# Patient Record
Sex: FEMALE | Race: BLACK OR AFRICAN AMERICAN
[De-identification: names, ages, dates, MRNs, and addresses within clinical notes are randomized per-mention and may not be internally consistent; named-entity substitution may affect disease eponyms.]

---

## 2017-09-03 ENCOUNTER — HEALTH MAINTENANCE LETTER (OUTPATIENT)
Age: 22
End: 2017-09-03

## 2018-01-07 ENCOUNTER — HEALTH MAINTENANCE LETTER (OUTPATIENT)
Age: 23
End: 2018-01-07

## 2019-08-23 ENCOUNTER — HOSPITAL ENCOUNTER (EMERGENCY)
Facility: CLINIC | Age: 24
Discharge: HOME | End: 2019-08-23
Attending: EMERGENCY MEDICINE | Admitting: EMERGENCY MEDICINE

## 2019-08-23 VITALS
SYSTOLIC BLOOD PRESSURE: 108 MMHG | WEIGHT: 161 LBS | OXYGEN SATURATION: 96 % | DIASTOLIC BLOOD PRESSURE: 70 MMHG | RESPIRATION RATE: 16 BRPM | TEMPERATURE: 98.1 F

## 2019-08-23 DIAGNOSIS — R10.84 ABDOMINAL PAIN, GENERALIZED: ICD-10-CM

## 2019-08-23 LAB
ALBUMIN SERPL-MCNC: 3.5 G/DL (ref 3.4–5)
ALBUMIN UR-MCNC: NEGATIVE MG/DL
ALP SERPL-CCNC: 115 U/L (ref 40–150)
ALT SERPL W P-5'-P-CCNC: 39 U/L (ref 0–50)
AMORPH CRY #/AREA URNS HPF: ABNORMAL /HPF
ANION GAP SERPL CALCULATED.3IONS-SCNC: 7 MMOL/L (ref 3–14)
APPEARANCE UR: CLEAR
AST SERPL W P-5'-P-CCNC: 26 U/L (ref 0–45)
BACTERIA #/AREA URNS HPF: ABNORMAL /HPF
BASOPHILS # BLD AUTO: 0 10E9/L (ref 0–0.2)
BASOPHILS NFR BLD AUTO: 0.2 %
BILIRUB SERPL-MCNC: 0.2 MG/DL (ref 0.2–1.3)
BILIRUB UR QL STRIP: NEGATIVE
BUN SERPL-MCNC: 18 MG/DL (ref 7–30)
CALCIUM SERPL-MCNC: 8.6 MG/DL (ref 8.5–10.1)
CHLORIDE SERPL-SCNC: 105 MMOL/L (ref 94–109)
CO2 SERPL-SCNC: 27 MMOL/L (ref 20–32)
COLOR UR AUTO: YELLOW
CREAT SERPL-MCNC: 0.76 MG/DL (ref 0.52–1.04)
DIFFERENTIAL METHOD BLD: NORMAL
EOSINOPHIL # BLD AUTO: 0.1 10E9/L (ref 0–0.7)
EOSINOPHIL NFR BLD AUTO: 1.5 %
ERYTHROCYTE [DISTWIDTH] IN BLOOD BY AUTOMATED COUNT: 12.3 % (ref 10–15)
GFR SERPL CREATININE-BSD FRML MDRD: >90 ML/MIN/{1.73_M2}
GLUCOSE SERPL-MCNC: 126 MG/DL (ref 70–99)
GLUCOSE UR STRIP-MCNC: NEGATIVE MG/DL
HCG UR QL: NEGATIVE
HCT VFR BLD AUTO: 40.3 % (ref 35–47)
HGB BLD-MCNC: 13.5 G/DL (ref 11.7–15.7)
HGB UR QL STRIP: NEGATIVE
IMM GRANULOCYTES # BLD: 0 10E9/L (ref 0–0.4)
IMM GRANULOCYTES NFR BLD: 0.4 %
KETONES UR STRIP-MCNC: NEGATIVE MG/DL
LEUKOCYTE ESTERASE UR QL STRIP: NEGATIVE
LIPASE SERPL-CCNC: 152 U/L (ref 73–393)
LYMPHOCYTES # BLD AUTO: 3.3 10E9/L (ref 0.8–5.3)
LYMPHOCYTES NFR BLD AUTO: 40.4 %
MCH RBC QN AUTO: 29.3 PG (ref 26.5–33)
MCHC RBC AUTO-ENTMCNC: 33.5 G/DL (ref 31.5–36.5)
MCV RBC AUTO: 88 FL (ref 78–100)
MONOCYTES # BLD AUTO: 0.7 10E9/L (ref 0–1.3)
MONOCYTES NFR BLD AUTO: 8 %
NEUTROPHILS # BLD AUTO: 4.1 10E9/L (ref 1.6–8.3)
NEUTROPHILS NFR BLD AUTO: 49.5 %
NITRATE UR QL: NEGATIVE
NRBC # BLD AUTO: 0 10*3/UL
NRBC BLD AUTO-RTO: 0 /100
PH UR STRIP: 6.5 PH (ref 5–7)
PLATELET # BLD AUTO: 290 10E9/L (ref 150–450)
POTASSIUM SERPL-SCNC: 3.6 MMOL/L (ref 3.4–5.3)
PROT SERPL-MCNC: 8.2 G/DL (ref 6.8–8.8)
RBC # BLD AUTO: 4.6 10E12/L (ref 3.8–5.2)
RBC #/AREA URNS AUTO: 1 /HPF (ref 0–2)
SODIUM SERPL-SCNC: 139 MMOL/L (ref 133–144)
SOURCE: ABNORMAL
SP GR UR STRIP: 1.02 (ref 1–1.03)
SQUAMOUS #/AREA URNS AUTO: 2 /HPF (ref 0–1)
UROBILINOGEN UR STRIP-MCNC: 2 MG/DL (ref 0–2)
WBC # BLD AUTO: 8.3 10E9/L (ref 4–11)
WBC #/AREA URNS AUTO: 1 /HPF (ref 0–5)

## 2019-08-23 PROCEDURE — 81025 URINE PREGNANCY TEST: CPT | Performed by: FAMILY MEDICINE

## 2019-08-23 PROCEDURE — 83690 ASSAY OF LIPASE: CPT | Performed by: EMERGENCY MEDICINE

## 2019-08-23 PROCEDURE — 99283 EMERGENCY DEPT VISIT LOW MDM: CPT | Performed by: EMERGENCY MEDICINE

## 2019-08-23 PROCEDURE — 80053 COMPREHEN METABOLIC PANEL: CPT | Performed by: EMERGENCY MEDICINE

## 2019-08-23 PROCEDURE — 81001 URINALYSIS AUTO W/SCOPE: CPT | Performed by: FAMILY MEDICINE

## 2019-08-23 PROCEDURE — 99283 EMERGENCY DEPT VISIT LOW MDM: CPT | Mod: Z6 | Performed by: EMERGENCY MEDICINE

## 2019-08-23 PROCEDURE — 85025 COMPLETE CBC W/AUTO DIFF WBC: CPT | Performed by: EMERGENCY MEDICINE

## 2019-08-23 ASSESSMENT — ENCOUNTER SYMPTOMS
FREQUENCY: 1
ARTHRALGIAS: 0
SHORTNESS OF BREATH: 0
DYSURIA: 0
COLOR CHANGE: 0
BLOOD IN STOOL: 0
HEADACHES: 1
NAUSEA: 1
CONFUSION: 0
DIARRHEA: 1
FEVER: 0
NECK STIFFNESS: 0
ABDOMINAL PAIN: 1
VOMITING: 0
DIFFICULTY URINATING: 0

## 2019-08-23 NOTE — ED AVS SNAPSHOT
[facility], [place], Emergency Department  [address]  [facility] [place] [address]  Phone:  [phone]  Fax:  [phone]                                    Dara Echeverria   MRN: [id number]    Department:  [facility], Emergency Department   Date of Visit:  8/23/2019           After Visit Summary Signature Page    I have received my discharge instructions, and my questions have been answered. I have discussed any challenges I see with this plan with the nurse or doctor.    ..........................................................................................................................................  Patient/Patient Representative Signature      ..........................................................................................................................................  Patient Representative Print Name and Relationship to Patient    ..................................................               ................................................  Date                                   Time    ..........................................................................................................................................  Reviewed by Signature/Title    ...................................................              ..............................................  Date                                               Time          22EPIC Rev 08/18

## 2019-08-24 NOTE — DISCHARGE INSTRUCTIONS
Please make an appointment to follow [facility] with [facility] (phone: [phone] as soon as possible if you have any concerns.

## 2020-08-17 NOTE — ED PROVIDER NOTES
[facility] EM[facility]Y DE[facility] ([facility])     August 23, 2019    History     Chief Complaint   Patient presents with     Abdominal Pain     LMP 2 months ago, thinks she might be pregnant. having left sided abd pain and headache.     АЛЕКСАНДР Echeverria is an otherwise healthy 24 year old female who presents to the ED for evaluation of a mild headache and left-sided abdominal pain that started yesterday. Patient reports her LMP was 2 months ago, and she thinks she might be pregnant. She states she is sexually active with her , and she is not on any form of birth control. Patient has not taken a pregnancy test at home. She complains of nausea and urinary frequency. She states she has had a little diarrhea as well. Patient denies vaginal spotting or discharge, dysuria, vomiting, or blood in her stool. She also denies recent travel. Of note, patient states she had similar symptoms in the past and found out she was pregnant. She reports she has been pregnant once, and had a vaginal delivery without complications in June 2018. Patient denies previous abdominal surgeries.      PAST MEDICAL HISTORY  History reviewed. No pertinent past medical history.  PAST SURGICAL HISTORY  History reviewed. No pertinent surgical history.  FAMILY HISTORY  History reviewed. No pertinent family history.  SOCIAL HISTORY  Social History     Tobacco Use     Smoking status: Never Smoker     Smokeless tobacco: Never Used   Substance Use Topics     Alcohol use: No     MEDICATIONS  No current facility-administered medications for this encounter.      No current outpatient medications on file.     ALLERGIES  No Known Allergies    I have reviewed the Medications, Allergies, Past Medical and Surgical History, and Social History in the Epic system.    Review of Systems   Constitutional: Negative for fever.   HENT: Negative for congestion.    Eyes: Negative for visual disturbance.   Respiratory: Negative for shortness of breath.     Cardiovascular: Negative for chest pain.   Gastrointestinal: Positive for abdominal pain (left-sided), diarrhea and nausea. Negative for blood in stool and vomiting.   Genitourinary: Positive for frequency. Negative for difficulty urinating, dysuria, vaginal bleeding and vaginal discharge.   Musculoskeletal: Negative for arthralgias and neck stiffness.   Skin: Negative for color change.   Neurological: Positive for headaches (mild).   Psychiatric/Behavioral: Negative for confusion.       Physical Exam   BP: 129/81  Heart Rate: 94  Temp: 97.9  F (36.6  C)  Resp: 16  Weight: 73 kg (161 lb)  SpO2: 100 %      Physical Exam   Constitutional: No distress.   HENT:   Head: Atraumatic.   Mouth/Throat: Oropharynx is clear and moist. No oropharyngeal exudate.   Eyes: Pupils are equal, round, and reactive to light. No scleral icterus.   Cardiovascular: Normal heart sounds and intact distal pulses.   Pulmonary/Chest: Breath sounds normal. No respiratory distress.   Abdominal: Soft. Bowel sounds are normal. There is no rigidity, no rebound, no guarding, no CVA tenderness and no tenderness at McBurney's point.   Mild tenderness palpation in left lower quadrant without guarding rebound or other peritoneal signs.  Abdomen is otherwise soft.  Normal bowel sounds x4.  No overlying skin changes.  CVA tenderness negative.   Musculoskeletal: She exhibits no edema or tenderness.   Skin: Skin is warm. No rash noted. She is not diaphoretic.       ED Course        Procedures       7:34 PM  The patient was seen and examined by Dr. Berger in Room 05.          Labs Ordered and Resulted from Time of ED Arrival Up to the Time of Departure from the ED   ROUTINE UA WITH MICROSCOPIC - Abnormal; Notable for the following components:       Result Value    Bacteria Urine Few (*)     Squamous Epithelial /HPF Urine 2 (*)     Amorphous Crystals Few (*)     All other components within normal limits   COMPREHENSIVE METABOLIC PANEL - Abnormal; Notable for  the following components:    Glucose 126 (*)     All other components within normal limits   HCG QUALITATIVE URINE   CBC WITH PLATELETS DIFFERENTIAL   LIPASE            Assessments & Plan (with Medical Decision Making)   Patient presents with left lower quadrant discomfort.  On exam, she has normal vital signs.  She has some very minimal tenderness in this area but really has a nonspecific physical exam.  I did order some baseline labs in the analysis and urine pregnancy test.  This work-up is negative.  On reassessment, patient states that her pain is completely resolved.  She admits that she really just wanted a pregnancy test to confirm that she is not pregnant.  She is not having any abdominal pain at this time.  I told her that urine pregnancy test can be falsely negative early in pregnancy and if she continues to have metrorhaggia she should follow-up with her doctor and/or OB/GYN for repeat evaluation.  Patient understands and is comfortable with this plan.  She will return to the emergency department with any new or worsening symptoms.      I have reviewed the nursing notes.    I have reviewed the findings, diagnosis, plan and need for follow up with the patient.    New Prescriptions    No medications on file       Final diagnoses:   Abdominal pain, generalized     ILeticia, am serving as a trained medical scribe to document services personally performed by Taco Berger DO, based on the provider's statements to me.      ITaco DO, was physically present and have reviewed and verified the accuracy of this note documented by Leticia Hassan.     8/23/2019   [facility], [place], EMERGENCY DEPARTMENT     Taco Berger DO  08/23/19 2059       Taco Berger DO  08/23/19 8011     DISPLAY PLAN FREE TEXT DISPLAY PLAN FREE TEXT DISPLAY PLAN FREE TEXT

## 2021-04-08 ENCOUNTER — PRE VISIT (OUTPATIENT)
Dept: NEUROLOGY | Facility: CLINIC | Age: 26
End: 2021-04-08

## 2021-04-08 NOTE — TELEPHONE ENCOUNTER
FUTURE VISIT INFORMATION      FUTURE VISIT INFORMATION:    Date: 4/12/2021    Time: 330pm    Location: [facility]  REFERRAL INFORMATION:    Referring provider:  Self     Referring providers clinic:      Reason for visit/diagnosis  Headaches     RECORDS REQUESTED FROM:       Clinic name Comments Records Status Imaging Status   [facility] ED Visit-2/19/2021    CT Head-2/19/2021 Care Everywhere Requested to PACS                                   4/8/2021-Request for Images faxed to Greg-MR @ 6am    4/12/2021-[facility] Images now in PACS-MR @ 607am

## 2021-04-12 ENCOUNTER — VIRTUAL VISIT (OUTPATIENT)
Dept: NEUROLOGY | Facility: CLINIC | Age: 26
End: 2021-04-12
Payer: COMMERCIAL

## 2021-04-12 DIAGNOSIS — G43.719 INTRACTABLE CHRONIC MIGRAINE WITHOUT AURA AND WITHOUT STATUS MIGRAINOSUS: Primary | ICD-10-CM

## 2021-04-12 PROCEDURE — 99202 OFFICE O/P NEW SF 15 MIN: CPT | Mod: 95 | Performed by: PSYCHIATRY & NEUROLOGY

## 2021-04-12 RX ORDER — METHOCARBAMOL 750 MG/1
750 TABLET, FILM COATED ORAL 3 TIMES DAILY
COMMUNITY
Start: 2021-02-19 | End: 2021-04-12

## 2021-04-12 RX ORDER — NAPROXEN 500 MG/1
500 TABLET ORAL 2 TIMES DAILY
COMMUNITY
Start: 2021-02-19 | End: 2021-04-12

## 2021-04-12 RX ORDER — DOCUSATE SODIUM 100 MG/1
100 CAPSULE, LIQUID FILLED ORAL 2 TIMES DAILY
COMMUNITY
End: 2021-12-20

## 2021-04-12 RX ORDER — SUMATRIPTAN 50 MG/1
50 TABLET, FILM COATED ORAL
Qty: 9 TABLET | Refills: 11 | Status: SHIPPED | OUTPATIENT
Start: 2021-04-12 | End: 2021-12-20

## 2021-04-12 RX ORDER — NORETHINDRONE ACETATE AND ETHINYL ESTRADIOL 1MG-20(21)
1 KIT ORAL DAILY
COMMUNITY
Start: 2020-01-31 | End: 2021-04-12

## 2021-04-12 RX ORDER — VITAMIN A ACETATE, BETA CAROTENE, ASCORBIC ACID, CHOLECALCIFEROL, .ALPHA.-TOCOPHEROL ACETATE, DL-, THIAMINE MONONITRATE, RIBOFLAVIN, NIACINAMIDE, PYRIDOXINE HYDROCHLORIDE, FOLIC ACID, CYANOCOBALAMIN, CALCIUM CARBONATE, FERROUS FUMARATE, ZINC OXIDE, CUPRIC OXIDE 3080; 12; 120; 400; 1; 1.84; 3; 20; 22; 920; 25; 200; 27; 10; 2 [IU]/1; UG/1; MG/1; [IU]/1; MG/1; MG/1; MG/1; MG/1; MG/1; [IU]/1; MG/1; MG/1; MG/1; MG/1; MG/1
1 TABLET, FILM COATED ORAL DAILY
COMMUNITY
End: 2021-12-20

## 2021-04-12 RX ORDER — FERROUS SULFATE 325(65) MG
325 TABLET ORAL 2 TIMES DAILY
COMMUNITY
End: 2021-12-20

## 2021-04-12 RX ORDER — PROPRANOLOL HCL 60 MG
60 CAPSULE, EXTENDED RELEASE 24HR ORAL DAILY
Qty: 30 CAPSULE | Refills: 11 | Status: SHIPPED | OUTPATIENT
Start: 2021-04-12 | End: 2021-12-20

## 2021-04-12 NOTE — LETTER
4/12/2021       RE: Suzy Arias  [address]  [facility] [address]     Dear Colleague,    Thank you for referring your patient, Suzy Arias, to the [facility] [place] [facility]. Please see a copy of my visit note below.    Suzy is a 26 year old who is being evaluated via a billable telephone visit.      What phone number would you like to be contacted at? [phone]  How would you like to obtain your AVS? MyChart  Phone call duration: 24 minutes          [facility]    Suzy Arias MRN# [id number]   Age: 26 year old YOB: 1995     Requesting physician: Referred Self  Marquita Wright            Assessment and Plan:   (G43.719) Intractable chronic migraine without aura and without status migrainosus  (primary encounter diagnosis)  Comment: The patient reports more days with a headache than without for over one year. When severe she has migraine features. She needs a preventive and I explained ot her about medication overuse risk.  Plan:   1. Preventive: propranolol ER (INDERAL LA) 60 MG 24 hr capsule picked due to limited risk of sedation or cognitive slowing. She denies history of low blood pressure or bradycardia  2. Acute treatment: SUMAtriptan (IMITREX) 50 MG tablet prn or ibuprofen or a combination. I explained medicaiton overuse risk    Follow up in person or on video in 3 months, not telephone.    Time caring for the patient today including phone time, record review and documentation 45 minutes.    Cinthya Castro mD           History of Present Illness:     chief complaint: No chief complaint on file.       Suzy Arias is a 26 year old patient presenting for assessment of headache.s She reports she has had headaches for years but getting worse. She recently was in the [facility] ER For a two week unrelenting headache accompanied by blurry vision. The  vision is better now and has not recurred.       Current headache symptoms:  Frequency: daily, but not constant  Quality: throbbing  Location: all over head, no one area, sometimes it feels like the ear is the problem  Duration: hours  Associated symptoms: nausea, photophobia, phonophobia, one episode of blurred vision  Awakens from sleep due to sx's:  No  Precipitating Injury:  No    Triggers: lack of sleep and stress    Previous Treatment Trials:  Acute therapies:  Ibuprofen  Aleve   Methocarbamol-makes her too sleepy    Chronic therapies:  None             Physical Exam:     Unable to do over phone             Pertinent history/data   [facility] Head CT performed and reportedly normal    No family history of migraines    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 Sleeping enough but very busy.     No plans for pregnancy right now, she is not sexually active and not using birth control.     Again, thank you for allowing me to participate in the care of your patient.      Sincerely,    Cinthya Castro MD

## 2021-04-12 NOTE — PROGRESS NOTES
Suzy is a 26 year old who is being evaluated via a billable telephone visit.      What phone number would you like to be contacted at? [phone]  How would you like to obtain your AVS? Tan  Phone call duration: 24 minutes          [facility]    Suzy Arias MRN# [id number]   Age: 26 year old YOB: 1995     Requesting physician: Referred Self  Marquita Wright            Assessment and Plan:   (G43.719) Intractable chronic migraine without aura and without status migrainosus  (primary encounter diagnosis)  Comment: The patient reports more days with a headache than without for over one year. When severe she has migraine features. She needs a preventive and I explained ot her about medication overuse risk.  Plan:   1. Preventive: propranolol ER (INDERAL LA) 60 MG 24 hr capsule picked due to limited risk of sedation or cognitive slowing. She denies history of low blood pressure or bradycardia  2. Acute treatment: SUMAtriptan (IMITREX) 50 MG tablet prn or ibuprofen or a combination. I explained medicaiton overuse risk    Follow up in person or on video in 3 months, not telephone.    Time caring for the patient today including phone time, record review and documentation 45 minutes.    Cinthya Castro mD           History of Present Illness:     chief complaint: No chief complaint on file.       Suzy Arias is a 26 year old patient presenting for assessment of headache.s She reports she has had headaches for years but getting worse. She recently was in the [facility] ER For a two week unrelenting headache accompanied by blurry vision. The vision is better now and has not recurred.       Current headache symptoms:  Frequency: daily, but not constant  Quality: throbbing  Location: all over head, no one area, sometimes it feels like the ear is the problem  Duration: hours  Associated symptoms: nausea, photophobia, phonophobia, one episode of blurred vision  Awakens from sleep due  to sx's:  No  Precipitating Injury:  No    Triggers: lack of sleep and stress    Previous Treatment Trials:  Acute therapies:  Ibuprofen  Aleve   Methocarbamol-makes her too sleepy    Chronic therapies:  None             Physical Exam:     Unable to do over phone             Pertinent history/data   [facility] Head CT performed and reportedly normal    No family history of migraines    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 Sleeping enough but very busy.     No plans for pregnancy right now, she is not sexually active and not using birth control.

## 2021-06-02 ENCOUNTER — ANCILLARY PROCEDURE (OUTPATIENT)
Dept: GENERAL RADIOLOGY | Facility: CLINIC | Age: 26
End: 2021-06-02
Attending: PHYSICIAN ASSISTANT
Payer: COMMERCIAL

## 2021-06-02 ENCOUNTER — OFFICE VISIT (OUTPATIENT)
Dept: FAMILY MEDICINE | Facility: CLINIC | Age: 26
End: 2021-06-02
Payer: COMMERCIAL

## 2021-06-02 VITALS
BODY MASS INDEX: 21.23 KG/M2 | WEIGHT: 127.4 LBS | SYSTOLIC BLOOD PRESSURE: 103 MMHG | HEIGHT: 65 IN | HEART RATE: 77 BPM | TEMPERATURE: 97.3 F | OXYGEN SATURATION: 100 % | DIASTOLIC BLOOD PRESSURE: 68 MMHG

## 2021-06-02 DIAGNOSIS — S59.912A INJURY OF LEFT FOREARM, INITIAL ENCOUNTER: Primary | ICD-10-CM

## 2021-06-02 DIAGNOSIS — T30.0 BURN: ICD-10-CM

## 2021-06-02 DIAGNOSIS — S59.912A INJURY OF LEFT FOREARM, INITIAL ENCOUNTER: ICD-10-CM

## 2021-06-02 PROCEDURE — 99203 OFFICE O/P NEW LOW 30 MIN: CPT | Performed by: PHYSICIAN ASSISTANT

## 2021-06-02 PROCEDURE — 73090 X-RAY EXAM OF FOREARM: CPT | Mod: LT | Performed by: RADIOLOGY

## 2021-06-02 RX ORDER — BACITRACIN ZINC 500 [USP'U]/G
OINTMENT TOPICAL 2 TIMES DAILY
Qty: 30 G | Refills: 1 | Status: SHIPPED | OUTPATIENT
Start: 2021-06-02

## 2021-06-02 SDOH — HEALTH STABILITY: MENTAL HEALTH: HOW MANY STANDARD DRINKS CONTAINING ALCOHOL DO YOU HAVE ON A TYPICAL DAY?: NOT ASKED

## 2021-06-02 SDOH — HEALTH STABILITY: MENTAL HEALTH: HOW OFTEN DO YOU HAVE 6 OR MORE DRINKS ON ONE OCCASION?: NOT ASKED

## 2021-06-02 SDOH — HEALTH STABILITY: MENTAL HEALTH: HOW OFTEN DO YOU HAVE A DRINK CONTAINING ALCOHOL?: NOT ASKED

## 2021-06-02 ASSESSMENT — PAIN SCALES - GENERAL: PAINLEVEL: NO PAIN (0)

## 2021-06-02 ASSESSMENT — MIFFLIN-ST. JEOR: SCORE: 1324.37
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
## 2021-12-20 ENCOUNTER — VIRTUAL VISIT (OUTPATIENT)
Dept: NEUROLOGY | Facility: CLINIC | Age: 26
End: 2021-12-20
Payer: COMMERCIAL

## 2021-12-20 DIAGNOSIS — G43.709 CHRONIC MIGRAINE WITHOUT AURA WITHOUT STATUS MIGRAINOSUS, NOT INTRACTABLE: ICD-10-CM

## 2021-12-20 DIAGNOSIS — R25.3 EYE MUSCLE TWITCHES: Primary | ICD-10-CM

## 2021-12-20 PROCEDURE — 99214 OFFICE O/P EST MOD 30 MIN: CPT | Mod: 95 | Performed by: PSYCHIATRY & NEUROLOGY

## 2021-12-20 RX ORDER — SUMATRIPTAN 50 MG/1
50 TABLET, FILM COATED ORAL
Qty: 9 TABLET | Refills: 11 | Status: SHIPPED | OUTPATIENT
Start: 2021-12-20

## 2021-12-20 RX ORDER — PROPRANOLOL HCL 60 MG
60 CAPSULE, EXTENDED RELEASE 24HR ORAL DAILY
Qty: 30 CAPSULE | Refills: 11 | Status: SHIPPED | OUTPATIENT
Start: 2021-12-20 | End: 2022-05-06

## 2021-12-20 ASSESSMENT — HEADACHE IMPACT TEST (HIT 6)
WHEN YOU HAVE A HEADACHE HOW OFTEN DO YOU WISH YOU COULD LIE DOWN: VERY OFTEN
HOW OFTEN DO HEADACHES LIMIT YOUR DAILY ACTIVITIES: VERY OFTEN
HIT6 TOTAL SCORE: 61
WHEN YOU HAVE HEADACHES HOW OFTEN IS THE PAIN SEVERE: VERY OFTEN
HOW OFTEN HAVE YOU FELT FED UP OR IRRITATED BECAUSE OF YOUR HEADACHES: VERY OFTEN
HOW OFTEN DID HEADACHS LIMIT CONCENTRATION ON WORK OR DAILY ACTIVITY: VERY OFTEN
HOW OFTEN HAVE YOU FELT TOO TIRED TO WORK BECAUSE OF YOUR HEADACHES: NEVER

## 2021-12-20 NOTE — LETTER
12/20/2021       RE: Suzy Arias  [address]  [place] [address]     Dear Colleague,    Thank you for referring your patient, Suzy Arias, to the [facility] NEUROLOGY [facility]IC [place] at [facility]. Please see a copy of my visit note below.    Last Patient-Answered HIT-6 Questionnaire  HIT-6 12/20/2021   When you have headaches, how often is the pain severe 11   How often do headaches limit your ability to do usual daily activities including household work, work, school, or social activities? 11   When you have a headache, how often do you wish you could lie down? 11   In the past 4 weeks, how often have you felt too tired to do work or daily activities because of your headaches 6   In the past 4 weeks, how often have you felt fed up or irritated because of your headaches 11   In the past 4 weeks, how often did headaches limit your ability to concentrate on work or daily activities 11   HIT-6 Total Score 61       MIGRAINE DISABILITY ASSESSMENT (MIDAS)    On how many days in the last 3 months did you miss work or school because of your headaches?  0    How many days in the last 3 months was your productivity at work or school reduced by half or more because of your headaches? (Do not include days you counted in question 1 where you missed work or school.)  2    On how many days in the last 3 months did you not do household work (such as housework, home repairs and maintenance, shopping, caring for children and relatives) because of your headaches?  0    How many days in the last 3 months was your productivity in household work reduced by half or more because of your headaches? (Do not include days you counted in question 3 where you did not do household work).  0    On how many days in the last 3 months did you miss family, social, or lesiure activities because of your headaches?  0    MIDAS Total Score:     On how  many days in the last 3 months did you have a headache? (If a headache lasted more than 1 day, count each day.)   45    On a scale of 0 - 10, on average how painful were these headaches (where 0 = no pain at all, and 10 = pain as bad as it can be.)  10          [facility]    Suzy Arias MRN# [id number]   Age: 26 year old YOB: 1995     Requesting physician:   Marquita Wright            Assessment and Plan:     (G43.709) Chronic migraine without aura and without status migrainosus  Comment: The patient has improvement in headaches.  I think it would be under better control if she was more consistent with her medication.  I asked her to try and take the propranolol daily and use the sumatriptan as her rescue medicine.  Plan:  1.  Preventive: Propranolol ER (INDERAL LA) 60 MG 24 hr  Capsule  2.  Acute/rescue medicine: SUMAtriptan (IMITREX) 50 MG tablet       Eye muscle twitches  Likely nothing to worry about.  I do think the patient should supplement her B12 level.  I discussed with her the risk of having a low vitamin B12 level including development of cognitive problems as well as neuropathy.  She understands the importance of B12 supplementation and will continue to supplement.    The iron supplementation seems to be less urgent at this point in time because she is not anemic and not suffering from restless leg syndrome.  We discussed trying to find iron in food rather than taking the supplement.    Both of these levels should be rechecked by her primary care physician in the future.    On the day of the visit I spent 32 minutes caring for the patient including chart review, video visit time and partial documentation time.    Cinthya Castro MD             History of Present Illness:   CC: Olu Almonte is a 26-year-old woman who speaks English and does not need an  for her visit.  She is seen today in follow-up for migraine headaches.  Last seen in April 2021.  At that  time she reported daily headaches that were not constant.  She was started on propranolol and she reports that it has helped reduce the headaches.  She still gets them from time to time but notes that she is not 100% compliant with taking propranolol on a daily basis because she does not like taking medication.  If she hits a bad spell with her headache she starts taking the propranolol more often and it helps.  She also has a rescue medicine of rizatriptan if needed.    She has noticed lately she is having some twitching of the muscles around her eye which can be somewhat annoying she is wondering if it is anything to be concerned about.  She does not notice any correlation with the twitching and her headaches.    In addition it is noted that her primary care physician screened her laboratory test results and found her B12 level to be low at 177.  She reports that she stopped taking the supplementation.  She is also low on iron stores although she is not anemic and she no longer takes an iron supplement.             Physical Exam:     Deferred         Pertinent History/Data for appointment:     Laboratory tests reviewed from October 18, 2021.  Comprehensive metabolic panel was normal.    B12 low at 177  Ferritin low 10.5  Iron normal 40  Iron binding capacity normal to 95  Iron percent saturation low 14%  TSH normal 1.4  Hemoglobin A1c normal 5.5  CBC reveals normal hemoglobin 13.8, low white blood cell count 3.5, normal platelet count 253      Cinthya Castro MD

## 2021-12-20 NOTE — PROGRESS NOTES
Suzy is a 26 year old who is being evaluated via a billable video visit.      How would you like to obtain your AVS? MyChart  If the video visit is dropped, the invitation should be resent by: Text to cell phone: [phone]  Will anyone else be joining your video visit? No      Video Start Time: 1:36 PM  Video-Visit Details    Type of service:  Video Visit    Video End Time: 1:55 PM      Originating Location (pt. Location): Home    Distant Location (provider location):  [facility] NEUROLOGY [facility]     Platform used for Video Visit: Kianna    Last Patient-Answered HIT-6 Questionnaire  HIT-6 12/20/2021   When you have headaches, how often is the pain severe 11   How often do headaches limit your ability to do usual daily activities including household work, work, school, or social activities? 11   When you have a headache, how often do you wish you could lie down? 11   In the past 4 weeks, how often have you felt too tired to do work or daily activities because of your headaches 6   In the past 4 weeks, how often have you felt fed up or irritated because of your headaches 11   In the past 4 weeks, how often did headaches limit your ability to concentrate on work or daily activities 11   HIT-6 Total Score 61       MIGRAINE DISABILITY ASSESSMENT (MIDAS)    On how many days in the last 3 months did you miss work or school because of your headaches?  0    How many days in the last 3 months was your productivity at work or school reduced by half or more because of your headaches? (Do not include days you counted in question 1 where you missed work or school.)  2    On how many days in the last 3 months did you not do household work (such as housework, home repairs and maintenance, shopping, caring for children and relatives) because of your headaches?  0    How many days in the last 3 months was your productivity in household work reduced by half or more because of your headaches? (Do not include days  you counted in question 3 where you did not do household work).  0    On how many days in the last 3 months did you miss family, social, or lesiure activities because of your headaches?  0    MIDAS Total Score:     On how many days in the last 3 months did you have a headache? (If a headache lasted more than 1 day, count each day.)   45    On a scale of 0 - 10, on average how painful were these headaches (where 0 = no pain at all, and 10 = pain as bad as it can be.)  10          [facility]    Suzy Arias MRN# [id number]   Age: 26 year old YOB: 1995     Requesting physician:   Marquita Wright            Assessment and Plan:     (G43.709) Chronic migraine without aura and without status migrainosus  Comment: The patient has improvement in headaches.  I think it would be under better control if she was more consistent with her medication.  I asked her to try and take the propranolol daily and use the sumatriptan as her rescue medicine.  Plan:  1.  Preventive: Propranolol ER (INDERAL LA) 60 MG 24 hr  Capsule  2.  Acute/rescue medicine: SUMAtriptan (IMITREX) 50 MG tablet       Eye muscle twitches  Likely nothing to worry about.  I do think the patient should supplement her B12 level.  I discussed with her the risk of having a low vitamin B12 level including development of cognitive problems as well as neuropathy.  She understands the importance of B12 supplementation and will continue to supplement.    The iron supplementation seems to be less urgent at this point in time because she is not anemic and not suffering from restless leg syndrome.  We discussed trying to find iron in food rather than taking the supplement.    Both of these levels should be rechecked by her primary care physician in the future.    On the day of the visit I spent 32 minutes caring for the patient including chart review, video visit time and partial documentation time.    Cinthya Castro MD             History of  Present Illness:   CC: Olu Almonte is a 26-year-old woman who speaks English and does not need an  for her visit.  She is seen today in follow-up for migraine headaches.  Last seen in April 2021.  At that time she reported daily headaches that were not constant.  She was started on propranolol and she reports that it has helped reduce the headaches.  She still gets them from time to time but notes that she is not 100% compliant with taking propranolol on a daily basis because she does not like taking medication.  If she hits a bad spell with her headache she starts taking the propranolol more often and it helps.  She also has a rescue medicine of rizatriptan if needed.    She has noticed lately she is having some twitching of the muscles around her eye which can be somewhat annoying she is wondering if it is anything to be concerned about.  She does not notice any correlation with the twitching and her headaches.    In addition it is noted that her primary care physician screened her laboratory test results and found her B12 level to be low at 177.  She reports that she stopped taking the supplementation.  She is also low on iron stores although she is not anemic and she no longer takes an iron supplement.             Physical Exam:     Deferred         Pertinent History/Data for appointment:     Laboratory tests reviewed from October 18, 2021.  Comprehensive metabolic panel was normal.    B12 low at 177  Ferritin low 10.5  Iron normal 40  Iron binding capacity normal to 95  Iron percent saturation low 14%  TSH normal 1.4  Hemoglobin A1c normal 5.5  CBC reveals normal hemoglobin 13.8, low white blood cell count 3.5, normal platelet count 253

## 2022-05-05 DIAGNOSIS — G43.709 CHRONIC MIGRAINE WITHOUT AURA WITHOUT STATUS MIGRAINOSUS, NOT INTRACTABLE: ICD-10-CM

## 2022-05-05 NOTE — TELEPHONE ENCOUNTER
Rx Authorization:    Requested Medication/ Dosepropranolol ER (INDERAL LA) 60 MG 24 hr capsule*    Date last refill ordered: 12/20/21    Quantity ordered: 30    # refills: 11    Date of last clinic visit with ordering provider: 12/20/21    Date of next clinic visit with ordering provider:     All pertinent protocol data (lab date/result):     Include pertinent information from patients message:

## 2022-05-06 RX ORDER — PROPRANOLOL HCL 60 MG
60 CAPSULE, EXTENDED RELEASE 24HR ORAL DAILY
Qty: 30 CAPSULE | Refills: 11 | Status: SHIPPED | OUTPATIENT
Start: 2022-05-06 | End: 2023-01-23

## 2023-01-23 DIAGNOSIS — G43.709 CHRONIC MIGRAINE WITHOUT AURA WITHOUT STATUS MIGRAINOSUS, NOT INTRACTABLE: ICD-10-CM

## 2023-01-23 RX ORDER — PROPRANOLOL HCL 60 MG
60 CAPSULE, EXTENDED RELEASE 24HR ORAL DAILY
Qty: 30 CAPSULE | Refills: 3 | Status: SHIPPED | OUTPATIENT
Start: 2023-01-23

## 2023-01-23 NOTE — TELEPHONE ENCOUNTER
Rx Authorization:    Requested Medication/ Dose propranolol ER (INDERAL LA) 60 MG 24 hr capsule    Date last refill ordered: 5/6/22    Quantity ordered: 30 capsules    # refills: 11    Date of last clinic visit with ordering provider: 12/20/21    Date of next clinic visit with ordering provider:     All pertinent protocol data (lab date/result):     Include pertinent information from patients message:

## 2023-01-24 ENCOUNTER — TELEPHONE (OUTPATIENT)
Dept: NEUROLOGY | Facility: CLINIC | Age: 28
End: 2023-01-24
Payer: COMMERCIAL

## 2023-01-24 NOTE — TELEPHONE ENCOUNTER
Called interpretive services, spoke to ID # [id number] and he left a message on pt's voicemail   Per Dr. Castro, she  needs to switch propranolol presciption to her primary care provider for futher refills. Call clinic # given if any questions.

## 2024-07-01 ENCOUNTER — OFFICE VISIT (OUTPATIENT)
Dept: URGENT CARE | Facility: URGENT CARE | Age: 29
End: 2024-07-01
Payer: COMMERCIAL

## 2024-07-01 VITALS
RESPIRATION RATE: 20 BRPM | DIASTOLIC BLOOD PRESSURE: 78 MMHG | HEART RATE: 74 BPM | SYSTOLIC BLOOD PRESSURE: 112 MMHG | OXYGEN SATURATION: 96 % | TEMPERATURE: 98.1 F | WEIGHT: 152.4 LBS

## 2024-07-01 DIAGNOSIS — R05.9 COUGH, UNSPECIFIED TYPE: ICD-10-CM

## 2024-07-01 DIAGNOSIS — Z20.818 STREPTOCOCCUS EXPOSURE: ICD-10-CM

## 2024-07-01 DIAGNOSIS — J20.8 ACUTE BACTERIAL BRONCHITIS: Primary | ICD-10-CM

## 2024-07-01 DIAGNOSIS — B96.89 ACUTE BACTERIAL BRONCHITIS: Primary | ICD-10-CM

## 2024-07-01 PROCEDURE — 99203 OFFICE O/P NEW LOW 30 MIN: CPT | Performed by: PHYSICIAN ASSISTANT

## 2024-07-01 RX ORDER — DEXTROMETHORPHAN POLISTIREX 30 MG/5ML
60 SUSPENSION ORAL 2 TIMES DAILY
Qty: 148 ML | Refills: 0 | Status: SHIPPED | OUTPATIENT
Start: 2024-07-01

## 2024-07-01 RX ORDER — AZITHROMYCIN 250 MG/1
TABLET, FILM COATED ORAL
Qty: 6 TABLET | Refills: 0 | Status: SHIPPED | OUTPATIENT
Start: 2024-07-01 | End: 2024-07-06

## 2024-07-01 NOTE — PROGRESS NOTES
Assessment & Plan     Acute bacterial bronchitis    Bronchitis is inflammation of the bronchial tubes, which carry air to the lungs. The tubes swell and produce mucus, or phlegm. The mucus and inflamed bronchial tubes make you cough. You may have trouble breathing.  Most cases of bronchitis are caused by viruses but in your case we are more concerned about a bacterial infection. . Antibiotics usually are helpful in this situation to help you clear this bacterial infection.  Bronchitis usually develops rapidly and lasts about 2 to 3 weeks in otherwise healthy people.    - azithromycin (ZITHROMAX) 250 MG tablet  Dispense: 6 tablet; Refill: 0    Streptococcus exposure    Zpak would cover strep throat  OTC motrin    Cough, unspecified type    Delsym for coughing  No wheezing present  - dextromethorphan (DELSYM) 30 MG/5ML liquid  Dispense: 148 mL; Refill: 0         No results found for any visits on 07/01/24.      No follow-ups on file.    Javier Acuna, [facility], PA-C  [facility]AM[facility] CenterSOFIA Galarza is a 29 year old female who presents to clinic today for the following health issues:  Chief Complaint   Patient presents with    Cough     Cough for the last month. Just got back from [place], [place] and [place]        HPI  Review of Systems  Constitutional, HEENT, cardiovascular, pulmonary, GI, , musculoskeletal, neuro, skin, endocrine and psych systems are negative, except as otherwise noted.      Objective    /78 (BP Location: Left arm, Patient Position: Sitting, Cuff Size: Adult Regular)   Pulse 74   Temp 98.1  F (36.7  C) (Oral)   Resp 20   Wt 69.1 kg (152 lb 6.4 oz)   SpO2 96%   Physical Exam   GENERAL: alert and no distress  EYES: Eyes grossly normal to inspection, PERRL and conjunctivae and sclerae normal  HENT: ear canals and TM's normal, nose and mouth without ulcers or lesions  NECK: no adenopathy, no asymmetry, masses, or scars  RESP: lungs clear to auscultation - no  rales, rhonchi or wheezes  CV: regular rate and rhythm, normal S1 S2, no S3 or S4, no murmur, click or rub, no peripheral edema  ABDOMEN: soft, nontender, no hepatosplenomegaly, no masses and bowel sounds normal  MS: no gross musculoskeletal defects noted, no edema  SKIN: no suspicious lesions or rashes  NEURO: Normal strength and tone, mentation intact and speech normal  PSYCH: mentation appears normal, affect normal/bright      No results found for any visits on 07/01/24.

## 2025-05-05 ENCOUNTER — TRANSCRIBE ORDERS (OUTPATIENT)
Dept: OTHER | Age: 30
End: 2025-05-05

## 2025-05-05 DIAGNOSIS — R69 DIAGNOSIS UNKNOWN: Primary | ICD-10-CM
